# Patient Record
Sex: MALE | Race: WHITE | NOT HISPANIC OR LATINO | Employment: OTHER | URBAN - NONMETROPOLITAN AREA
[De-identification: names, ages, dates, MRNs, and addresses within clinical notes are randomized per-mention and may not be internally consistent; named-entity substitution may affect disease eponyms.]

---

## 2022-02-10 RX ORDER — OMEPRAZOLE 20 MG/1
20 CAPSULE, DELAYED RELEASE ORAL DAILY
COMMUNITY

## 2022-02-10 RX ORDER — MELOXICAM 15 MG/1
15 TABLET ORAL DAILY
COMMUNITY

## 2022-02-10 RX ORDER — MULTIPLE VITAMINS W/ MINERALS TAB 9MG-400MCG
1 TAB ORAL DAILY
COMMUNITY

## 2022-02-21 NOTE — PROGRESS NOTES
"Chief Complaint  Elevated PSA    Subjective          Mio Askew presents to [facility] for elevated PSA.  He has a PSA of 24.  Saw Dr. Castillo in [place] a year ago who scheduled him for biopsy and patient canceled and then failed follow-up.  He presents to me today.  Patient denies any possible systemic symptoms of prostate cancer such as weight loss, lower extremity edema, or skeletal pain that could be worrisome for systemic disease.          Current Outpatient Medications:   •  Adalimumab (Humira) 20 MG/0.4ML Prefilled Syringe Kit, Inject  under the skin into the appropriate area as directed., Disp: , Rfl:   •  meloxicam (MOBIC) 15 MG tablet, Take 15 mg by mouth Daily., Disp: , Rfl:   •  multivitamin with minerals tablet tablet, Take 1 tablet by mouth Daily., Disp: , Rfl:   •  omeprazole (priLOSEC) 20 MG capsule, Take 20 mg by mouth Daily., Disp: , Rfl:   •  ciprofloxacin (Cipro) 500 MG tablet, Take 1 tablet by mouth 2 (Two) Times a Day. Begin the day before the biopsy. Take one on the morning of surgery too., Disp: 6 tablet, Rfl: 0  Past Medical History:   Diagnosis Date   • Elevated PSA      History reviewed. No pertinent surgical history.        Review  of systems  Constitutional: Negative for chills or fever.   Gastrointestinal: Negative for abdominal pain, anal bleeding or blood in stool.           Objective   PHYSICAL EXAM  Vital Signs:   Temp 98.3 °F (36.8 °C)   Ht 175.3 cm (69\")   Wt 79.3 kg (174 lb 12.8 oz)   BMI 25.81 kg/m²     Constitutional: Patient is without distress or deformity.  Vital signs are reviewed as above.    Neuro: No confusion; No disorientation; Alert and oriented  Pulmonary: No respiratory distress.   Skin: No pallor or diaphoresis  GI: Abdomen is soft and nontender.  No significant distention.  No hernias noted.  : Penis and testicles are normal.  Abnormal feeling prostate exam with a right apical nodule greater than 2 cm extending through mid " portion of prostate as well as across midline.  The prostate DOES NOT FEEL fixed.      DATA  Result Review :              No results found for this or any previous visit.                     ASSESSMENT AND PLAN          Problem List Items Addressed This Visit     None      Visit Diagnoses     Elevated PSA    -  Primary    Relevant Medications    ciprofloxacin (Cipro) 500 MG tablet    Other Relevant Orders    Case Request (Completed)    COVID PRE-OP / PRE-PROCEDURE SCREENING ORDER (NO ISOLATION) - Swab, Nasopharynx      I discussed elevated PSA with him today. We discussed that PSA is a protein measured in the bloodstream that comes exclusively from the prostate gland.  I mentioned to him that all men with a prostate gland will have a certain PSA level. We discussed that this number can be compared to all men and age-specific PSA as well as PSA velocity. We discussed that Prostate Cancer is a possible cause of PSA elevaton, but benign etiologies such as infection, enlargement, aging, and inflammation should also be considered. We discussed that some patients with a normal PSA may also have prostate cancer. The necessity of digital rectal examination is also discussed. The role of free to total PSA Ratio is explained.  We also discussed the relatively recent recommendations of the national prevented task force.  It is explained that the PSA has been downgraded as a standard screening tool.  Essentially this downgrading recommends the study not be used or prostate cancer screening done due to the high risk of a diagnosis of prostate cancer that is life-threatening which, if treated, can result in sexual or severe urinary side effects.  This is compared with comments by both the American Urologic Association and the American Association Clinical Urologists. Discussions among the urology community has led most of us to feel strongly that a patient has a right to know if he has prostate cancer and that all options  "including a conservative approach to the management of prostate cancer should be discussed between A patient and a physician familiar with the treatment options for prostate cancer.  Additional emphasis is made regarding the possibility of the diagnosis of the nonlife threatening prostate cancer which could affect the patient psychologically or even result for treatment of disease were the risks of that treatment exceeds the risk of death.  The risks (infection, bleeding, pain, retention, sepsis) and possible benefits of transrectal ultrasound with biopsy of the prostate gland is also discussed. It is explained that some patients require a repeat biopsy based on diagnosis of prostate intraepithelial neoplasia or even a continued rising PSA after an initial biopsy. He voiced no additional questions. He has elected to proceed with TRUS and biopsy of the prostate     I have told the patient about the FDA warning regariding flouroquinolone use.  It is explained to the patient that this medication should not be used in cases of \"uncomplicated \"urinary tract infection. It is explained that these antibiotics continue to be listed as an option for prophylaxis for prostate biopsy. I also explained my experience with other antibiotics as prophylaxis which I think has resulted in increased episodes of prostatitis with bacteremia which can potentially be associated with sepsis. Patient is told to contact me should they have symptoms that I described that may involve tendons, muscles, nerves, or central nervous system.  It is explained that the medication would then be stopped and other less effective measures would be taken.  Of available products, I believe that this is the best antibiotic in this situation with the benefits outweighing the risks.      FOLLOW UP     No follow-ups on file.        (Please note that portions of this note were completed with a voice recognition program.)  Elia Blanco MD  02/22/22  11:54 CST  "

## 2022-02-22 ENCOUNTER — OFFICE VISIT (OUTPATIENT)
Dept: UROLOGY | Facility: CLINIC | Age: 57
End: 2022-02-22

## 2022-02-22 VITALS — HEIGHT: 69 IN | BODY MASS INDEX: 25.89 KG/M2 | TEMPERATURE: 98.3 F | WEIGHT: 174.8 LBS

## 2022-02-22 DIAGNOSIS — R97.20 ELEVATED PSA: Primary | ICD-10-CM

## 2022-02-22 PROCEDURE — 99204 OFFICE O/P NEW MOD 45 MIN: CPT | Performed by: UROLOGY

## 2022-02-22 RX ORDER — CIPROFLOXACIN 500 MG/1
500 TABLET, FILM COATED ORAL 2 TIMES DAILY
Qty: 6 TABLET | Refills: 0 | Status: SHIPPED | OUTPATIENT
Start: 2022-02-22 | End: 2022-02-25 | Stop reason: SDUPTHER

## 2022-02-22 RX ORDER — ADALIMUMAB 20MG/0.4ML
KIT SUBCUTANEOUS
COMMUNITY

## 2022-02-25 ENCOUNTER — PRE-ADMISSION TESTING (OUTPATIENT)
Dept: PREADMISSION TESTING | Facility: HOSPITAL | Age: 57
End: 2022-02-25

## 2022-02-25 ENCOUNTER — LAB (OUTPATIENT)
Dept: LAB | Facility: HOSPITAL | Age: 57
End: 2022-02-25

## 2022-02-25 VITALS
OXYGEN SATURATION: 100 % | HEIGHT: 69 IN | DIASTOLIC BLOOD PRESSURE: 85 MMHG | SYSTOLIC BLOOD PRESSURE: 128 MMHG | BODY MASS INDEX: 27.66 KG/M2 | WEIGHT: 186.73 LBS | RESPIRATION RATE: 18 BRPM | HEART RATE: 85 BPM

## 2022-02-25 DIAGNOSIS — R97.20 ELEVATED PSA: ICD-10-CM

## 2022-02-25 LAB
DEPRECATED RDW RBC AUTO: 43.4 FL (ref 37–54)
ERYTHROCYTE [DISTWIDTH] IN BLOOD BY AUTOMATED COUNT: 12.7 % (ref 12.3–15.4)
HCT VFR BLD AUTO: 42.5 % (ref 37.5–51)
HGB BLD-MCNC: 14.2 G/DL (ref 13–17.7)
MCH RBC QN AUTO: 30.9 PG (ref 26.6–33)
MCHC RBC AUTO-ENTMCNC: 33.4 G/DL (ref 31.5–35.7)
MCV RBC AUTO: 92.6 FL (ref 79–97)
PLATELET # BLD AUTO: 259 10*3/MM3 (ref 140–450)
PMV BLD AUTO: 9.9 FL (ref 6–12)
RBC # BLD AUTO: 4.59 10*6/MM3 (ref 4.14–5.8)
SARS-COV-2 ORF1AB RESP QL NAA+PROBE: DETECTED
WBC NRBC COR # BLD: 5.41 10*3/MM3 (ref 3.4–10.8)

## 2022-02-25 PROCEDURE — U0004 COV-19 TEST NON-CDC HGH THRU: HCPCS

## 2022-02-25 PROCEDURE — 36415 COLL VENOUS BLD VENIPUNCTURE: CPT

## 2022-02-25 PROCEDURE — C9803 HOPD COVID-19 SPEC COLLECT: HCPCS

## 2022-02-25 PROCEDURE — 85027 COMPLETE CBC AUTOMATED: CPT

## 2022-02-25 RX ORDER — CIPROFLOXACIN 500 MG/1
500 TABLET, FILM COATED ORAL 2 TIMES DAILY
Qty: 6 TABLET | Refills: 0 | Status: SHIPPED | OUTPATIENT
Start: 2022-02-25

## 2022-02-28 ENCOUNTER — ANESTHESIA EVENT (OUTPATIENT)
Dept: PERIOP | Facility: HOSPITAL | Age: 57
End: 2022-02-28

## 2022-02-28 ENCOUNTER — HOSPITAL ENCOUNTER (OUTPATIENT)
Facility: HOSPITAL | Age: 57
Setting detail: HOSPITAL OUTPATIENT SURGERY
Discharge: HOME | End: 2022-02-28
Attending: UROLOGY | Admitting: UROLOGY

## 2022-02-28 ENCOUNTER — ANESTHESIA (OUTPATIENT)
Dept: PERIOP | Facility: HOSPITAL | Age: 57
End: 2022-02-28

## 2022-02-28 VITALS
OXYGEN SATURATION: 98 % | SYSTOLIC BLOOD PRESSURE: 122 MMHG | HEART RATE: 82 BPM | TEMPERATURE: 98 F | DIASTOLIC BLOOD PRESSURE: 83 MMHG | RESPIRATION RATE: 15 BRPM

## 2022-02-28 DIAGNOSIS — R97.20 ELEVATED PSA: ICD-10-CM

## 2022-02-28 PROCEDURE — 76942 ECHO GUIDE FOR BIOPSY: CPT | Performed by: UROLOGY

## 2022-02-28 PROCEDURE — 55700 PR PROSTATE NEEDLE BIOPSY ANY APPROACH: CPT | Performed by: UROLOGY

## 2022-02-28 PROCEDURE — 25010000002 PROPOFOL 10 MG/ML EMULSION: Performed by: NURSE ANESTHETIST, CERTIFIED REGISTERED

## 2022-02-28 PROCEDURE — G0416 PROSTATE BIOPSY, ANY MTHD: HCPCS | Performed by: UROLOGY

## 2022-02-28 PROCEDURE — 25010000002 GENTAMICIN PER 80 MG: Performed by: UROLOGY

## 2022-02-28 PROCEDURE — 0 LIDOCAINE 1 % SOLUTION: Performed by: UROLOGY

## 2022-02-28 PROCEDURE — 25010000002 MIDAZOLAM PER 1 MG: Performed by: ANESTHESIOLOGY

## 2022-02-28 PROCEDURE — 25010000002 PROPOFOL 1000 MG/100ML EMULSION: Performed by: NURSE ANESTHETIST, CERTIFIED REGISTERED

## 2022-02-28 PROCEDURE — 25010000002 ONDANSETRON PER 1 MG: Performed by: NURSE ANESTHETIST, CERTIFIED REGISTERED

## 2022-02-28 PROCEDURE — 25010000002 MIDAZOLAM PER 1 MG: Performed by: NURSE ANESTHETIST, CERTIFIED REGISTERED

## 2022-02-28 PROCEDURE — 25010000002 FENTANYL CITRATE (PF) 100 MCG/2ML SOLUTION: Performed by: NURSE ANESTHETIST, CERTIFIED REGISTERED

## 2022-02-28 RX ORDER — LIDOCAINE HYDROCHLORIDE 10 MG/ML
0.5 INJECTION, SOLUTION EPIDURAL; INFILTRATION; INTRACAUDAL; PERINEURAL ONCE AS NEEDED
Status: DISCONTINUED | OUTPATIENT
Start: 2022-02-28 | End: 2022-02-28 | Stop reason: HOSPADM

## 2022-02-28 RX ORDER — SODIUM CHLORIDE, SODIUM LACTATE, POTASSIUM CHLORIDE, CALCIUM CHLORIDE 600; 310; 30; 20 MG/100ML; MG/100ML; MG/100ML; MG/100ML
1000 INJECTION, SOLUTION INTRAVENOUS CONTINUOUS
Status: DISCONTINUED | OUTPATIENT
Start: 2022-02-28 | End: 2022-02-28 | Stop reason: HOSPADM

## 2022-02-28 RX ORDER — ONDANSETRON 2 MG/ML
INJECTION INTRAMUSCULAR; INTRAVENOUS AS NEEDED
Status: DISCONTINUED | OUTPATIENT
Start: 2022-02-28 | End: 2022-02-28 | Stop reason: SURG

## 2022-02-28 RX ORDER — DEXTROSE MONOHYDRATE 25 G/50ML
12.5 INJECTION, SOLUTION INTRAVENOUS AS NEEDED
Status: DISCONTINUED | OUTPATIENT
Start: 2022-02-28 | End: 2022-02-28 | Stop reason: HOSPADM

## 2022-02-28 RX ORDER — ONDANSETRON 2 MG/ML
4 INJECTION INTRAMUSCULAR; INTRAVENOUS ONCE AS NEEDED
Status: DISCONTINUED | OUTPATIENT
Start: 2022-02-28 | End: 2022-02-28 | Stop reason: HOSPADM

## 2022-02-28 RX ORDER — LABETALOL HYDROCHLORIDE 5 MG/ML
5 INJECTION, SOLUTION INTRAVENOUS
Status: DISCONTINUED | OUTPATIENT
Start: 2022-02-28 | End: 2022-02-28 | Stop reason: HOSPADM

## 2022-02-28 RX ORDER — SODIUM CHLORIDE 0.9 % (FLUSH) 0.9 %
3 SYRINGE (ML) INJECTION AS NEEDED
Status: DISCONTINUED | OUTPATIENT
Start: 2022-02-28 | End: 2022-02-28 | Stop reason: HOSPADM

## 2022-02-28 RX ORDER — OXYCODONE AND ACETAMINOPHEN 7.5; 325 MG/1; MG/1
2 TABLET ORAL EVERY 4 HOURS PRN
Status: DISCONTINUED | OUTPATIENT
Start: 2022-02-28 | End: 2022-02-28 | Stop reason: HOSPADM

## 2022-02-28 RX ORDER — OXYCODONE AND ACETAMINOPHEN 10; 325 MG/1; MG/1
1 TABLET ORAL ONCE AS NEEDED
Status: DISCONTINUED | OUTPATIENT
Start: 2022-02-28 | End: 2022-02-28 | Stop reason: HOSPADM

## 2022-02-28 RX ORDER — OXYCODONE HYDROCHLORIDE AND ACETAMINOPHEN 5; 325 MG/1; MG/1
1 TABLET ORAL ONCE AS NEEDED
Status: DISCONTINUED | OUTPATIENT
Start: 2022-02-28 | End: 2022-02-28 | Stop reason: HOSPADM

## 2022-02-28 RX ORDER — LIDOCAINE HYDROCHLORIDE 10 MG/ML
INJECTION, SOLUTION INFILTRATION; PERINEURAL AS NEEDED
Status: DISCONTINUED | OUTPATIENT
Start: 2022-02-28 | End: 2022-02-28 | Stop reason: HOSPADM

## 2022-02-28 RX ORDER — ULTRASOUND COUPLING MEDIUM
GEL (GRAM) TOPICAL AS NEEDED
Status: DISCONTINUED | OUTPATIENT
Start: 2022-02-28 | End: 2022-02-28 | Stop reason: HOSPADM

## 2022-02-28 RX ORDER — TRAMADOL HYDROCHLORIDE 50 MG/1
50 TABLET ORAL EVERY 6 HOURS PRN
Qty: 12 TABLET | Refills: 0 | Status: SHIPPED | OUTPATIENT
Start: 2022-02-28

## 2022-02-28 RX ORDER — FENTANYL CITRATE 50 UG/ML
25 INJECTION, SOLUTION INTRAMUSCULAR; INTRAVENOUS
Status: DISCONTINUED | OUTPATIENT
Start: 2022-02-28 | End: 2022-02-28 | Stop reason: HOSPADM

## 2022-02-28 RX ORDER — FENTANYL CITRATE 50 UG/ML
INJECTION, SOLUTION INTRAMUSCULAR; INTRAVENOUS AS NEEDED
Status: DISCONTINUED | OUTPATIENT
Start: 2022-02-28 | End: 2022-02-28 | Stop reason: SURG

## 2022-02-28 RX ORDER — SODIUM CHLORIDE 0.9 % (FLUSH) 0.9 %
10 SYRINGE (ML) INJECTION AS NEEDED
Status: DISCONTINUED | OUTPATIENT
Start: 2022-02-28 | End: 2022-02-28 | Stop reason: HOSPADM

## 2022-02-28 RX ORDER — NALOXONE HCL 0.4 MG/ML
0.4 VIAL (ML) INJECTION AS NEEDED
Status: DISCONTINUED | OUTPATIENT
Start: 2022-02-28 | End: 2022-02-28 | Stop reason: HOSPADM

## 2022-02-28 RX ORDER — DROPERIDOL 2.5 MG/ML
0.62 INJECTION, SOLUTION INTRAMUSCULAR; INTRAVENOUS ONCE AS NEEDED
Status: DISCONTINUED | OUTPATIENT
Start: 2022-02-28 | End: 2022-02-28 | Stop reason: HOSPADM

## 2022-02-28 RX ORDER — FLUMAZENIL 0.1 MG/ML
0.2 INJECTION INTRAVENOUS AS NEEDED
Status: DISCONTINUED | OUTPATIENT
Start: 2022-02-28 | End: 2022-02-28 | Stop reason: HOSPADM

## 2022-02-28 RX ORDER — SODIUM CHLORIDE 0.9 % (FLUSH) 0.9 %
10 SYRINGE (ML) INJECTION EVERY 12 HOURS SCHEDULED
Status: DISCONTINUED | OUTPATIENT
Start: 2022-02-28 | End: 2022-02-28 | Stop reason: HOSPADM

## 2022-02-28 RX ORDER — GENTAMICIN SULFATE 80 MG/100ML
80 INJECTION, SOLUTION INTRAVENOUS ONCE
Status: COMPLETED | OUTPATIENT
Start: 2022-02-28 | End: 2022-02-28

## 2022-02-28 RX ORDER — IBUPROFEN 600 MG/1
600 TABLET ORAL ONCE AS NEEDED
Status: DISCONTINUED | OUTPATIENT
Start: 2022-02-28 | End: 2022-02-28 | Stop reason: HOSPADM

## 2022-02-28 RX ORDER — MIDAZOLAM HYDROCHLORIDE 1 MG/ML
INJECTION INTRAMUSCULAR; INTRAVENOUS AS NEEDED
Status: DISCONTINUED | OUTPATIENT
Start: 2022-02-28 | End: 2022-02-28 | Stop reason: SURG

## 2022-02-28 RX ORDER — KETAMINE HCL IN NACL, ISO-OSM 100MG/10ML
SYRINGE (ML) INJECTION AS NEEDED
Status: DISCONTINUED | OUTPATIENT
Start: 2022-02-28 | End: 2022-02-28 | Stop reason: SURG

## 2022-02-28 RX ORDER — PROPOFOL 10 MG/ML
VIAL (ML) INTRAVENOUS AS NEEDED
Status: DISCONTINUED | OUTPATIENT
Start: 2022-02-28 | End: 2022-02-28 | Stop reason: SURG

## 2022-02-28 RX ORDER — SODIUM CHLORIDE, SODIUM LACTATE, POTASSIUM CHLORIDE, CALCIUM CHLORIDE 600; 310; 30; 20 MG/100ML; MG/100ML; MG/100ML; MG/100ML
9 INJECTION, SOLUTION INTRAVENOUS CONTINUOUS
Status: DISCONTINUED | OUTPATIENT
Start: 2022-02-28 | End: 2022-02-28 | Stop reason: HOSPADM

## 2022-02-28 RX ORDER — MIDAZOLAM HYDROCHLORIDE 1 MG/ML
1 INJECTION INTRAMUSCULAR; INTRAVENOUS
Status: COMPLETED | OUTPATIENT
Start: 2022-02-28 | End: 2022-02-28

## 2022-02-28 RX ORDER — PROPOFOL 10 MG/ML
INJECTION, EMULSION INTRAVENOUS AS NEEDED
Status: DISCONTINUED | OUTPATIENT
Start: 2022-02-28 | End: 2022-02-28 | Stop reason: SURG

## 2022-02-28 RX ADMIN — Medication 30 MG: at 09:07

## 2022-02-28 RX ADMIN — MIDAZOLAM 1 MG: 1 INJECTION INTRAMUSCULAR; INTRAVENOUS at 08:49

## 2022-02-28 RX ADMIN — PROPOFOL 125 MCG/KG/MIN: 10 INJECTION, EMULSION INTRAVENOUS at 09:03

## 2022-02-28 RX ADMIN — SODIUM CHLORIDE, POTASSIUM CHLORIDE, SODIUM LACTATE AND CALCIUM CHLORIDE 1000 ML: 600; 310; 30; 20 INJECTION, SOLUTION INTRAVENOUS at 08:41

## 2022-02-28 RX ADMIN — FENTANYL CITRATE 50 MCG: 50 INJECTION, SOLUTION INTRAMUSCULAR; INTRAVENOUS at 09:03

## 2022-02-28 RX ADMIN — MIDAZOLAM 1 MG: 1 INJECTION INTRAMUSCULAR; INTRAVENOUS at 09:00

## 2022-02-28 RX ADMIN — MIDAZOLAM 1 MG: 1 INJECTION INTRAMUSCULAR; INTRAVENOUS at 09:03

## 2022-02-28 RX ADMIN — MIDAZOLAM 1 MG: 1 INJECTION INTRAMUSCULAR; INTRAVENOUS at 08:59

## 2022-02-28 RX ADMIN — GENTAMICIN SULFATE 80 MG: 80 INJECTION, SOLUTION INTRAVENOUS at 08:40

## 2022-02-28 RX ADMIN — PROPOFOL 100 MG: 10 INJECTION, EMULSION INTRAVENOUS at 09:08

## 2022-02-28 RX ADMIN — Medication 20 MG: at 09:03

## 2022-02-28 RX ADMIN — ONDANSETRON 4 MG: 2 INJECTION INTRAMUSCULAR; INTRAVENOUS at 09:16

## 2022-02-28 RX ADMIN — FENTANYL CITRATE 50 MCG: 50 INJECTION, SOLUTION INTRAMUSCULAR; INTRAVENOUS at 09:00

## 2022-02-28 NOTE — ANESTHESIA PREPROCEDURE EVALUATION
Anesthesia Evaluation     Patient summary reviewed   no history of anesthetic complications:  NPO Solid Status: > 8 hours             Airway   Mallampati: II  TM distance: >3 FB  Neck ROM: full  Dental      Pulmonary    (+) pneumonia , a smoker,   (-) COPD, asthma, sleep apnea  Cardiovascular   Exercise tolerance: excellent (>7 METS)    (-) pacemaker, past MI, angina, cardiac stents      Neuro/Psych  (-) seizures, TIA, CVA  GI/Hepatic/Renal/Endo    (+)  GERD,    (-) liver disease, no renal disease, diabetes    Musculoskeletal     Abdominal    Substance History      OB/GYN          Other                        Anesthesia Plan    ASA 2     MAC     intravenous induction     Anesthetic plan, all risks, benefits, and alternatives have been provided, discussed and informed consent has been obtained with: patient.        CODE STATUS:

## 2022-02-28 NOTE — ANESTHESIA POSTPROCEDURE EVALUATION
Patient: Mio Askew    Procedure Summary     Date: 02/28/22 Room / Location:  [facility] OR 01 / [facility]    Anesthesia Start: 0900 Anesthesia Stop: 0924    Procedure: PROSTATE ULTRASOUND BIOPSY. NOT A URONAV (N/A ) Diagnosis:       Elevated PSA      (Elevated PSA [R97.20])    Surgeons: Elia Blacno MD Provider: Rossy Cisse CRNA    Anesthesia Type: general ASA Status: 2          Anesthesia Type: general    Vitals  Vitals Value Taken Time   /89 02/28/22 0942   Temp 98 °F (36.7 °C) 02/28/22 0950   Pulse 83 02/28/22 0952   Resp 15 02/28/22 0950   SpO2 99 % 02/28/22 0951   Vitals shown include unvalidated device data.        Post Anesthesia Care and Evaluation    Patient location during evaluation: PACU  Patient participation: complete - patient participated  Level of consciousness: awake  Pain management: adequate  Airway patency: patent  Anesthetic complications: No anesthetic complications  PONV Status: none  Cardiovascular status: acceptable  Respiratory status: acceptable  Hydration status: acceptable    Comments: /83   Pulse 82   Temp 98 °F (36.7 °C) (Temporal)   Resp 15   SpO2 98%

## 2022-03-01 LAB
CYTO UR: NORMAL
LAB AP CASE REPORT: NORMAL
PATH REPORT.FINAL DX SPEC: NORMAL
PATH REPORT.GROSS SPEC: NORMAL

## 2022-03-07 NOTE — PROGRESS NOTES
I have attempted to reach him three times since his biopsy results have come back. His voicemail box is full. Will try to reach again later this week.

## 2022-03-08 ENCOUNTER — TELEPHONE (OUTPATIENT)
Dept: UROLOGY | Facility: CLINIC | Age: 57
End: 2022-03-08

## 2022-03-11 ENCOUNTER — TELEPHONE (OUTPATIENT)
Dept: UROLOGY | Facility: CLINIC | Age: 57
End: 2022-03-11

## 2022-03-11 NOTE — PROGRESS NOTES
This patient is again attempted to be reached by phone unsuccessfully.  It also gives a message that his mailbox is full.  I have tried to call this patient multiple times with his pathology report which shows cancer.  Certainly if this patient were to call us back we will at least let him know the pathology report does show cancer and then he needs a CT scan of the abdomen, and pelvis, as well as a bone scan.  Would also attempt to set up a cancer consult with me to discuss his options.    I am going to send a letter that needs to be mailed to him registered.

## 2022-03-11 NOTE — TELEPHONE ENCOUNTER
"I did reach his father, Eliud Askew, that has him listed as his \"patient contact \".  I told him that I have been trying to reach his son about his biopsy.  I did not leave results because I do not have permission in the chart to do that.  Frankly he did not even ask about them.  He told me he lives in [place] and does not see his son.  He also said he would know what to do about his cell phone situation.  As mentioned on previous notes the patient's voice mailbox is full and he is not answering the phone.  I am going to try a registered letter.  Electronically signed by Elia Blanco MD, 03/11/22, 10:09 AM CST.    "

## 2022-03-15 DIAGNOSIS — R97.20 ELEVATED PSA: Primary | ICD-10-CM

## 2022-03-15 NOTE — TELEPHONE ENCOUNTER
Patient called back I gave him the information let him know he will need to be scheduled for a ct abd/pelvis and a bone scan before his cancer consult apt. He is scheduled end of clinic on 03/31 @ 6240. Pt verbalized understanding. I answered his questions to the best of my abilities.

## 2022-08-09 ENCOUNTER — TELEPHONE (OUTPATIENT)
Dept: UROLOGY | Age: 57
End: 2022-08-09

## 2022-08-09 DIAGNOSIS — D07.5 CARCINOMA IN SITU OF PROSTATE: Primary | ICD-10-CM

## 2022-08-12 NOTE — TELEPHONE ENCOUNTER
After reviewing records it was determined Dr. Yuliya Galeas needs to review prior to scheduling. Records on his desk for review prior to any apt made.
Alexa Taylor called to f/u on pt referral, scanned in media, please review and advise for scheduling.
Called and notified Bryant Claudio pt would not be seen here, per provider due to his non compliance and being established with other urologist in the past.
Unable to access careeverywhere on this pt but based [facility] with [facility]b [facility] he is a [facility] pt of Dr. Dionicio Hunt showing prostate cancer. Will need office notes from them as well as any other office notes if Dr. Dionicio Hunt referred out to treat. Once received please print and give to me to review.     Please input referral under referral tab for this
Waiting on Alexander's office to send records.  Requested twice now
no

## 2022-09-29 ENCOUNTER — TELEPHONE (OUTPATIENT)
Dept: UROLOGY | Facility: CLINIC | Age: 57
End: 2022-09-29

## 2022-09-29 NOTE — TELEPHONE ENCOUNTER
[facility] spoke to the [facility] ([facility]P) so they were aware. Last contact with patient they had was around June and the patient requested a second opinion. They have tried to contact patient since with no response.

## 2022-09-29 NOTE — TELEPHONE ENCOUNTER
We received certified letter back with a note of unable to forward. I left voicemail for patient to call our office and it was very important.

## 2023-02-17 ENCOUNTER — TELEPHONE (OUTPATIENT)
Dept: UROLOGY | Facility: CLINIC | Age: 58
End: 2023-02-17
Payer: OTHER GOVERNMENT

## 2023-02-17 NOTE — TELEPHONE ENCOUNTER
Pt called requesting his Tissue Pathology report faxed to Johnathan Irvin at the [facility]. I faxed over the report to [phone]

## 2023-03-02 ENCOUNTER — TELEPHONE (OUTPATIENT)
Dept: UROLOGY | Facility: CLINIC | Age: 58
End: 2023-03-02
Payer: OTHER GOVERNMENT

## 2023-03-02 NOTE — TELEPHONE ENCOUNTER
Patient called requesting his biopsy results from last March due to never being contacted.  I informed patient that Dr. Blanco tried on multiple occasions without a response, and that he even called his father to try to get ahold of him.  Patient became very aggressive and vulgar stating we don't care if he has cancer because if we did, we would have contacted him.    I looked into the chart, we sent a certified letter with said results to patient's residence, we got a notification back stating that the address doesn't exist. I confirmed same address with patient today, he said that is the correct one.   He also had a follow up appointment and imaging that he no showed to.    I told him that I would look into the situation and try to do my best to help him out.

## 2024-05-22 ENCOUNTER — TRANSCRIBE ORDERS (OUTPATIENT)
Dept: ADMINISTRATIVE | Facility: HOSPITAL | Age: 59
End: 2024-05-22
Payer: OTHER GOVERNMENT

## 2024-05-22 DIAGNOSIS — C61 MALIGNANT NEOPLASM OF PROSTATE: Primary | ICD-10-CM

## 2024-06-04 ENCOUNTER — TRANSCRIBE ORDERS (OUTPATIENT)
Dept: ADMINISTRATIVE | Facility: HOSPITAL | Age: 59
End: 2024-06-04
Payer: OTHER GOVERNMENT

## 2024-06-15 ENCOUNTER — TRANSCRIBE ORDERS (OUTPATIENT)
Dept: ADMINISTRATIVE | Facility: HOSPITAL | Age: 59
End: 2024-06-15
Payer: OTHER GOVERNMENT

## 2024-06-15 DIAGNOSIS — D07.5 CARCINOMA IN SITU OF PROSTATE: Primary | ICD-10-CM

## 2024-06-21 ENCOUNTER — TRANSCRIBE ORDERS (OUTPATIENT)
Dept: ADMINISTRATIVE | Facility: HOSPITAL | Age: 59
End: 2024-06-21
Payer: OTHER GOVERNMENT

## 2024-07-02 ENCOUNTER — HOSPITAL ENCOUNTER (OUTPATIENT)
Dept: CT IMAGING | Facility: HOSPITAL | Age: 59
Discharge: HOME | End: 2024-07-02
Payer: OTHER GOVERNMENT

## 2024-12-30 ENCOUNTER — TRANSCRIBE ORDERS (OUTPATIENT)
Dept: ADMINISTRATIVE | Facility: HOSPITAL | Age: 59
End: 2024-12-30
Payer: OTHER GOVERNMENT

## 2024-12-30 ENCOUNTER — HOSPITAL ENCOUNTER (OUTPATIENT)
Dept: MRI IMAGING | Facility: HOSPITAL | Age: 59
Discharge: HOME | End: 2024-12-30
Admitting: NURSE PRACTITIONER
Payer: OTHER GOVERNMENT

## 2024-12-30 DIAGNOSIS — D07.5 CARCINOMA IN SITU OF PROSTATE: Primary | ICD-10-CM

## 2025-01-24 ENCOUNTER — HOSPITAL ENCOUNTER (OUTPATIENT)
Dept: MRI IMAGING | Facility: HOSPITAL | Age: 60
Discharge: HOME | End: 2025-01-24
Admitting: NURSE PRACTITIONER
Payer: OTHER GOVERNMENT

## 2025-01-24 ENCOUNTER — HOSPITAL ENCOUNTER (OUTPATIENT)
Dept: MRI IMAGING | Facility: HOSPITAL | Age: 60
Discharge: HOME | End: 2025-01-24
Payer: OTHER GOVERNMENT

## 2025-02-26 PROBLEM — C61 MALIGNANT NEOPLASM OF PROSTATE: Status: ACTIVE | Noted: 2024-04-04

## 2025-02-27 PROBLEM — F17.200 CURRENT EVERY DAY SMOKER: Status: ACTIVE | Noted: 2025-02-27

## 2025-03-17 ENCOUNTER — TELEPHONE (OUTPATIENT)
Age: 60
End: 2025-03-17
Payer: OTHER GOVERNMENT

## 2025-03-17 NOTE — TELEPHONE ENCOUNTER
Called patient about missed appointment today to see if they need to reschedule. I left message on voicemail.

## 2025-03-18 ENCOUNTER — TELEPHONE (OUTPATIENT)
Age: 60
End: 2025-03-18
Payer: OTHER GOVERNMENT

## 2025-03-18 NOTE — TELEPHONE ENCOUNTER
Tried to reach patient regarding his No show appointment. I have left a message on his number and his Dad's number to give us a call if he wants to reschedule.

## 2025-03-25 ENCOUNTER — TELEPHONE (OUTPATIENT)
Age: 60
End: 2025-03-25
Payer: OTHER GOVERNMENT
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
## 2025-04-08 NOTE — PROGRESS NOTES
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 encounter.    RECOMMENDATIONS:  Mio Askew was diagnosed ***(Summary)    Indications and rationale as well as risks, benefits and alternatives of therapy for carcinoma of the prostate were discussed today. Risks of radiation therapy includes but is not limited to radiation induced proctitis, cystitis, diarrhea, dysuria, frequency and bleeding from the bladder or rectum as well as a significant risk of permanent erectile dysfunction and progression of disease in spite of therapy with either local or systemic failure.  The option of definitive surgery has also been reviewed by the urologist as well as surveillance. We discussed the goals and plans of care with him and his family, answered all questions and he verbalizes understanding. I have seen, examined and reviewed this patient's medication list, appropriate labs and imaging studies as well as other physician notes.    A definitive course of fractionated radiation therapy is recommended to the {Rad Onc Prostate/Bed (Optional):84626}. I anticipate a course of {Rad Onc Prostate Dose (Optional):77996}.    The patient and family verbalize  understanding of this discussion, voice no further questions and wish to {Rad Pt decision:08803}    ***Will refer back to urologist for initiation of ADT, seed placement and Space OAR gel placement. Continue ongoing management per primary care physician and other specialists.    Mio Askew  reports that he has been smoking cigarettes. He has never used smokeless tobacco. I have educated him on the risk of diseases from using tobacco products such as cancer, COPD, and heart disease. I spent >10 minutes counseling the patient.    Thank you for allowing me to assist in his care.    There are no Patient Instructions on file for this visit.    No follow-ups on file.    {Time Spent (Optional):82129}  Dina Yo LPN  04/10/2025

## 2025-04-09 ENCOUNTER — HOSPITAL ENCOUNTER (OUTPATIENT)
Dept: RADIATION ONCOLOGY | Facility: HOSPITAL | Age: 60
Setting detail: RADIATION/ONCOLOGY SERIES
End: 2025-04-09
Payer: OTHER GOVERNMENT

## 2025-04-10 ENCOUNTER — APPOINTMENT (OUTPATIENT)
Age: 60
End: 2025-04-10
Payer: OTHER GOVERNMENT

## 2025-04-10 DIAGNOSIS — C61 MALIGNANT NEOPLASM OF PROSTATE: Primary | ICD-10-CM

## 2025-04-10 DIAGNOSIS — F17.200 CURRENT EVERY DAY SMOKER: ICD-10-CM

## 2025-04-17 NOTE — PROGRESS NOTES
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      Mental Status: He is alert and oriented to person, place, and time.      Motor: No weakness.   Psychiatric:         Mood and Affect: Mood normal.         Behavior: Behavior normal.         Performance Status: ECOG (0) Fully active, able to carry on all predisease performance without restriction    Clinical Quality Measures  - Pain Documented by Standardized Tool, FPS Mio Askew reports a pain score of 0. Given his pain assessment as noted, treatment options were discussed and the following options were decided upon as a follow-up plan to address the patient's pain:  No pain, no plan given .  Pain Medications              Adalimumab (Humira) 20 MG/0.4ML Prefilled Syringe Kit Inject  under the skin into the appropriate area as directed Every 14 (Fourteen) Days.    meloxicam (MOBIC) 15 MG tablet Take 1 tablet by mouth Daily.          - Body Mass Index Screening and Follow-Up Plan  BMI is >= 25 and <30. (Overweight) The following options were offered after discussion;: none (medical contraindication)    - Tobacco Use: Screening and Cessation Intervention  Social History    Tobacco Use      Smoking status: Every Day        Packs/day: 1.00        Types: Cigarettes       "Smokeless tobacco: Never    Smoking cessation information given in after visit summary.    - Advanced Care Planning Advance Care Planning  ACP discussion was held with the patient during this visit. Patient does not have an advance directive, information provided.    - PHQ-2 Depression Screening  Little interest or pleasure in doing things? Not at all   Feeling down, depressed, or hopeless? Not at all (\"once a week\")   PHQ-2 Total Score 0     PROSTATE PQRS #102   Bone Scan Use: Bone Scane done Pre-treatment or Post Diagnosis  Risk of Recurrence: High risk PSA > 20/GL 8-10/T3a  Measure #104  Adjuvant Hormonal TX: Hormonal Therapy Not Prescribed/Administered. Reason Not Specified  Recurrence Risk: High risk PSA > 20/GL 8-10/T3a    ASSESSMENT AND PLAN  1. Malignant neoplasm of prostate    2. Current every day smoker      Orders Placed This Encounter   Procedures    PSA Diagnostic     Standing Status:   Future     Number of Occurrences:   1     Expected Date:   4/27/2025     Expiration Date:   7/22/2026     Release to patient:   Routine Release [[id number]]    Ambulatory Referral to ONC Social Work     Referral Priority:   Routine     Referral Type:   Clinical Pathway     Referral Reason:   Specialty Services Required     Requested Specialty:        Number of Visits Requested:   1    Ambulatory Referral to Urology     Referral Priority:   Routine     Referral Type:   Consultation     Referral Reason:   Specialty Services Required     Referred to Provider:   Elia Blanco MD     Requested Specialty:   Urology     Number of Visits Requested:   1    Ambulatory Referral to General Surgery     Referral Priority:   Routine     Referral Type:   Consultation     Referral Reason:   Specialty Services Required     Referred to Provider:   Rubens Cosby MD     Requested Specialty:   General Surgery     Number of Visits Requested:   1     RECOMMENDATIONS:  Mio Askew was diagnosed high risk prostate " carcinoma.  He has been seen at multiple facilities over the last several years including [facility].  However, he has delayed proceeding with initiation of any form of therapy.  This time he states that he is ready to proceed with definitive radiotherapy.  He is on endocrine at that therapy at this time but does not want to go on an injectable form such as Lupron.    Indications and rationale as well as risks, benefits and alternatives of therapy for carcinoma of the prostate were discussed today. Risks of radiation therapy includes but is not limited to radiation induced proctitis, cystitis, diarrhea, dysuria, frequency and bleeding from the bladder or rectum as well as a significant risk of permanent erectile dysfunction and progression of disease in spite of therapy with either local or systemic failure.  The option of definitive surgery has also been reviewed by the urologist as well as surveillance. We discussed the goals and plans of care with him and his family, answered all questions and he verbalizes understanding. I have seen, examined and reviewed this patient's medication list, appropriate labs and imaging studies as well as other physician notes.    A definitive course of fractionated radiation therapy is recommended to the prostate and regional pelvic lymph nodes. I anticipate a course of 7000 cGy over 28 treatment fractions with 5040 cGy to the pelvic lymph nodes.    The patient verbalize understanding of this discussion, voice no further questions and wish to process with recommended therapy.  Will refer to urologist for seed placement and Space OAR gel placement. Continue ongoing management per primary care physician and other specialists.    Mio Askew  reports that he has been smoking cigarettes. He has never used smokeless tobacco. I have educated him on the risk of diseases from using tobacco products such as cancer, COPD, and heart disease. I spent >10 minutes counseling the  patient.    Thank you for allowing me to assist in his care.    Patient Instructions   1)  PSA level today to check if pill is working on prostate cancer  2)  Referral made to urology for seeds and gel, they will call you  3)  We will see you back about a week after seeds and gel for CT treatment planning for your prostate cancer  4)  Plan on 28 radiation treatments M-F for about 30 minutes daily over about 6 weeks.  5)  Referral made to surgeon to fix hernia, they will call you      Time Spent: I spent 95 minutes caring for Mio on this date of service. This time includes time spent by me in the following activities: preparing for the visit, reviewing tests, obtaining and/or reviewing a separately obtained history, performing a medically appropriate examination and/or evaluation, counseling and educating the patient/family/caregiver, ordering medications, tests, or procedures, and documenting information in the medical record.   Yosef Mcfadden III, MD  04/22/2025

## 2025-04-21 ENCOUNTER — TELEPHONE (OUTPATIENT)
Age: 60
End: 2025-04-21
Payer: OTHER GOVERNMENT

## 2025-04-22 ENCOUNTER — DOCUMENTATION (OUTPATIENT)
Dept: RADIATION ONCOLOGY | Facility: HOSPITAL | Age: 60
End: 2025-04-22
Payer: OTHER GOVERNMENT

## 2025-04-22 ENCOUNTER — CONSULT (OUTPATIENT)
Age: 60
End: 2025-04-22
Payer: OTHER GOVERNMENT

## 2025-04-22 ENCOUNTER — LAB (OUTPATIENT)
Dept: LAB | Facility: HOSPITAL | Age: 60
End: 2025-04-22
Payer: OTHER GOVERNMENT

## 2025-04-22 VITALS
HEIGHT: 69 IN | SYSTOLIC BLOOD PRESSURE: 137 MMHG | DIASTOLIC BLOOD PRESSURE: 80 MMHG | WEIGHT: 180 LBS | BODY MASS INDEX: 26.66 KG/M2

## 2025-04-22 DIAGNOSIS — F17.200 CURRENT EVERY DAY SMOKER: ICD-10-CM

## 2025-04-22 DIAGNOSIS — C61 MALIGNANT NEOPLASM OF PROSTATE: Primary | ICD-10-CM

## 2025-04-22 DIAGNOSIS — C61 MALIGNANT NEOPLASM OF PROSTATE: ICD-10-CM

## 2025-04-22 LAB — PSA SERPL-MCNC: 2.68 NG/ML (ref 0–4)

## 2025-04-22 PROCEDURE — 84153 ASSAY OF PSA TOTAL: CPT

## 2025-04-22 PROCEDURE — 99205 OFFICE O/P NEW HI 60 MIN: CPT | Performed by: RADIOLOGY

## 2025-04-22 PROCEDURE — G2212 PROLONG OUTPT/OFFICE VIS: HCPCS | Performed by: RADIOLOGY

## 2025-04-22 PROCEDURE — 36415 COLL VENOUS BLD VENIPUNCTURE: CPT

## 2025-04-22 PROCEDURE — G0463 HOSPITAL OUTPT CLINIC VISIT: HCPCS | Performed by: RADIOLOGY

## 2025-04-22 RX ORDER — FLUTICASONE PROPIONATE 50 MCG
2 SPRAY, SUSPENSION (ML) NASAL DAILY
COMMUNITY

## 2025-04-22 NOTE — PATIENT INSTRUCTIONS
1)  PSA level today to check if pill is working on prostate cancer  2)  Referral made to urology for seeds and gel, they will call you  3)  We will see you back about a week after seeds and gel for CT treatment planning for your prostate cancer  4)  Plan on 28 radiation treatments M-F for about 30 minutes daily over about 6 weeks.  5)  Referral made to surgeon to fix hernia, they will call you
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
## 2025-04-23 ENCOUNTER — RESULTS FOLLOW-UP (OUTPATIENT)
Age: 60
End: 2025-04-23
Payer: OTHER GOVERNMENT

## 2025-04-23 NOTE — TELEPHONE ENCOUNTER
----- Message from Yosef Mcfadden sent at 4/23/2025  7:58 AM CDT -----  Please notify him of results  ----- Message -----  From: Lab, Background User  Sent: 4/22/2025   8:59 PM CDT  To: Yosef Mcfadden III, MD

## 2025-04-25 ENCOUNTER — TELEPHONE (OUTPATIENT)
Age: 60
End: 2025-04-25
Payer: OTHER GOVERNMENT

## 2025-05-08 ENCOUNTER — TELEPHONE (OUTPATIENT)
Dept: SURGERY | Facility: CLINIC | Age: 60
End: 2025-05-08
Payer: OTHER GOVERNMENT

## 2025-05-08 NOTE — TELEPHONE ENCOUNTER
Attempted to contact patient due to a missed appointment today. I left patient a detailed voicemail with our office phone number to call back to reschedule.     ECC  5/8/25

## 2025-05-16 ENCOUNTER — OFFICE VISIT (OUTPATIENT)
Dept: SURGERY | Facility: CLINIC | Age: 60
End: 2025-05-16
Payer: OTHER GOVERNMENT

## 2025-05-16 VITALS
HEIGHT: 69 IN | HEART RATE: 83 BPM | SYSTOLIC BLOOD PRESSURE: 135 MMHG | DIASTOLIC BLOOD PRESSURE: 87 MMHG | BODY MASS INDEX: 26.81 KG/M2 | OXYGEN SATURATION: 99 % | WEIGHT: 181 LBS

## 2025-05-16 DIAGNOSIS — K40.90 INGUINAL HERNIA WITHOUT OBSTRUCTION OR GANGRENE, RECURRENCE NOT SPECIFIED, UNSPECIFIED LATERALITY: Primary | ICD-10-CM

## 2025-05-16 NOTE — PROGRESS NOTES
[facility] General Surgery Clinic History and Physical  Mio Askew  MRN: [id number]  Age: 59 y.o. male     YOB: 1965    Primary   Problem      Plaints of inguinal hernia    SUBJECTIVE  History  of Presenting Illness   Patient is a 59 y.o. male with pertinent past medical history of prostate cancer that was diagnosed on biopsy in 2022, for which he has still not started treatment-he has no showed multiple appointments, both at [facility], and with [facility] here, presenting for evaluation of inguinal hernia.    Per patient report, when he underwent prostate biopsy in 2022, part of the biopsy needle mechanism was left inside of him, and he believes this was torn and open, and caused an inguinal hernia.  Later states that he was not even aware of the hernia, but was found on imaging.  He does have a report of a PET scan from [facility] done earlier this year, that does mention an inguinal hernia, but I do not have that imaging available at this visit.  He does report that [facility] has him on Orgovyx and radiation therapy, for the prostate cancer, but does not have other details regarding his treatment regimen, his next follow-up is.      Past Medical History     Past Medical History:  Past Medical History:   Diagnosis Date    Asthma     Elevated PSA     GERD (gastroesophageal reflux disease)     PONV (postoperative nausea and vomiting)        PCP: Julien Barajas PA    Past Surgical History:  Past Surgical History:   Procedure Laterality Date    EYE SURGERY      KNEE SURGERY      PROSTATE ULTRASOUND BIOPSY N/A 2/28/2022    Procedure: PROSTATE ULTRASOUND BIOPSY. NOT A URONAV;  Surgeon: Elia Blanco MD;  [facility];  Service: Urology;  Laterality: N/A;    TRIGGER FINGER RELEASE      WRIST GANGLION EXCISION         Family History:  History reviewed. No pertinent family history.    Social History:  Social History     Tobacco Use    Smoking status: Every Day     Current packs/day:  "1.00     Types: Cigarettes    Smokeless tobacco: Never   Substance Use Topics    Alcohol use: Yes     Comment: occ       Allergies:  Allergies   Allergen Reactions    Augmentin [Amoxicillin-Pot Clavulanate] Other (See Comments) and GI Intolerance     MIGRAINE AND VOMITTING    Vicodin Hp [Hydrocodone-Acetaminophen] Other (See Comments) and GI Intolerance     MIGRAINE AND VOMITTING    Morphine GI Intolerance       Medications:  Current Outpatient Medications   Medication Instructions    Adalimumab (Humira) 20 MG/0.4ML Prefilled Syringe Kit Every 14 Days    fluticasone (FLONASE) 50 MCG/ACT nasal spray 2 sprays, Daily    meloxicam (MOBIC) 15 mg, Daily    multivitamin with minerals tablet tablet 1 tablet, Daily    omeprazole (PRILOSEC) 20 mg, Daily    relugolix (ORGOVYX) 120 mg, Daily           Review of Systems   Per HPI.  Physical Examination     Vitals:    05/16/25 0910   BP: 135/87   Pulse: 83   SpO2: 99%   Weight: 82.1 kg (181 lb)   Height: 175.3 cm (69.02\")       Estimated body mass index is 26.72 kg/m² as calculated from the following:    Height as of this encounter: 175.3 cm (69.02\").    Weight as of this encounter: 82.1 kg (181 lb).  PREVIOUS WEIGHTS:   Wt Readings from Last 5 Encounters:   05/16/25 82.1 kg (181 lb)   04/22/25 81.6 kg (180 lb)   02/25/22 84.7 kg (186 lb 11.7 oz)   02/22/22 79.3 kg (174 lb 12.8 oz)       Physical Examination:  Gen: Disheveled, unkempt male, appears older than stated age, poor hygiene-awake, laying on exam bed  HEENT: NCAT, poor dentition, anicteric sclera  CV: RRR, normotensive  Resp: nonlabored on room air, sats appropriate  Abd: soft, nontender, nondistended; no visible scars, no palpable hernia  : Both testes present in scrotum, on the left, is exquisitely tender to palpation, no palpable hernia defect, and no palpable herniated contents; on the right, patient again exquisitely tender, swelling and fatty infiltration of the pubis is pronounced, but there is no palpable " hernia defect, no tussive impulse on either side  MSK: moves all four, no c/c/e  Neuro: no focal deficits, cranial nerves grossly intact  Psy: Has difficulty carrying on conversation: Significant flight of ideas      Data Review     Labs:  CBC  Lab Results   Component Value Date    WBC 5.41 02/25/2022    HGB 14.2 02/25/2022    HCT 42.5 02/25/2022     02/25/2022      Pathology:  Lab Results   Component Value Date    CASEREPORT  02/28/2022     Surgical Pathology Report                         Case: [id number]                                  Authorizing Provider:  Elia Blanco MD       Collected:           02/28/2022 07:14 AM          Ordering Location:     [facility] OR  Received:            02/28/2022 10:16 AM          Pathologist:           Cornelius Senior MD                                                        Specimens:   1) - Prostate, RIGHT MID LATERAL                                                                    2) - Prostate, LEFT APEX                                                                            3) - Prostate, LEFT LATERAL APEX                                                                    4) - Prostate, LEFT BASE                                                                            5) - Prostate, LEFT LATERAL BASE                                                                    6) - Prostate, LEFT MID                                                                             7) - Prostate, LEFT MID LATERAL                                                                     8) - Prostate, RIGHT APEX                                                                           9) - Prostate, RIGHT LATERAL APEX                                                                   10) - Prostate, RIGHT BASE                                                                          11) - Prostate, RIGHT LATERAL BASE                                                                   12) - Prostate, RIGHT MID                                                                  FINALDX  02/28/2022     1.  Prostate, right mid lateral, core biopsy:  Prostatic adenocarcinoma, acinar type, Jose Alejandro grade 3+3 = 6 (grade group 1) involving 75% of submitted tissue.    2.  Prostate, left apex, core biopsy:  Benign prostate glands and stroma.    3.  Prostate, left lateral apex, core biopsy:  Benign prostate glands and stroma.    4.  Prostate, left base, core biopsy:  Prostatic adenocarcinoma, acinar type, [place] grade 3+3 = 6 (grade Group 1) involving 5% of submitted tissue.    5.  Prostate, left lateral base, core biopsy:  Benign prostate glands and stroma.    6.  Prostate, left mid, core biopsy:  Benign prostate glands and stroma.    7.  Prostate, left mid lateral, core biopsy:  Benign prostate glands and stroma.    8.  Prostate, right apex, core biopsy:  Prostatic adenocarcinoma, acinar type, [place] grade 3+3 = 6 (grade Group 1) involving 80% of submitted tissue.    9.  Prostate, right lateral apex, core biopsy:  Prostatic adenocarcinoma, acinar type, [place] grade 3+4 = 7 (grade group 2) involving 80% of submitted tissue.    10.  Prostate, right base, core biopsy:  Prostatic adenocarcinoma, acinar type, [place] grade 3+3 = 6 (grade Group 1) involving 90% of submitted tissue.    11.  Prostate, right lateral base, core biopsy:  Prostatic adenocarcinoma, acinar type, Jose Alejandro grade 3+4 = 7 (grade group 2) involving 50% of submitted tissue.    12.  Prostate, right mid, core biopsies:  Prostatic adenocarcinoma, acinar type, Jose Alejandro grade 3+4 = 7 (grade group 2) involving 90% of submitted tissue.      GROSSDES  02/28/2022     Specimen 1A is received in formalin container labeled with the patient's name, date of birth and designated as right mid lateral.  Specimen consists of single tan core measuring 0.6 x 0.1 x 0.1 cm.  After tissue is inked with hematoxlin, the specimen is submitted in single  cassette.       Specimen 2A is received in formalin container labeled with the patient's name, date of birth and designated as left apex.  Specimen consists of single tan core measuring 1.2 x 0.1 x 0.1 cm.  After tissue is inked with hematoxlin, the specimen is submitted in single cassette.    Specimen 3A is received in formalin container labeled with the patient's name, date of birth and designated as left lateral apex.  Specimen consists of single tan core measuring 1.5 x 0.1 cm.  After tissue is inked with hematoxlin, the specimen is submitted in single cassette.       Specimen 4A is received in formalin container labeled with the patient's name, date of birth and designated as left base.  Specimen consists of single tan core measuring 1.5 x 0.1 x 0.1.  After tissue is inked with hematoxlin, the specimen is submitted in single cassette.       Specimen 5A is received in formalin container labeled with the patient's name, date of birth and designated as left lateral base.  Specimen consists of single tan core measuring one-point.  After tissue is inked with hematoxlin, the specimen is submitted in single cassette.       Specimen 6A is received in formalin container labeled with the patient's name, date of birth and designated as left mid.  Specimen consists of 2 tan cores measuring 0.2 x 0.1 x 0.1 and 1.5 x 0.1 x 0.1 cm.  After tissue is inked with hematoxlin, the specimen is submitted in single cassette.       Specimen 7A is received in formalin container labeled with the patient's name, date of birth and designated as left mid lateral.  Specimen consists of 2 tan cores measuring 1.0 x 0.1 x 0.1 0.2 x 0.1 x 0.1cm.  After tissue is inked with hematoxlin, the specimen is submitted in single cassette.       Specimen 8A is received in formalin container labeled with the patient's name, date of birth and designated as right apex.  Specimen consists of single tan core measuring 1.3 x 0.1 x 0.1 cm.  After tissue is inked  with hematoxlin, the specimen is submitted in single cassette.       Specimen 9A is received in formalin container labeled with the patient's name, date of birth and designated as right lateral apex.  Specimen consists of single tan piece of tissue measuring 1.3 x 0.1 x 0.1 cm.  After tissue is inked with hematoxlin, the specimen is submitted in single cassette.       Specimen 10A is received in formalin container labeled with the patient's name, date of birth and designated as right base.  Specimen consists of single tan core measuring 1.5 x 0.1 x 01 cm.  After tissue is inked with hematoxlin, the specimen is submitted in single cassette.       Specimen 11A is received in formalin container labeled with the patient's name, date of birth and designated as right lateral base.  Specimen consists of single tan core measuring 1.0 x 0.1 x 0.1 cm.  After tissue is inked with hematoxlin, the specimen is submitted in single cassette.       Specimen 12A is received in formalin container labeled with the patient's name, date of birth and designated as right mid.  Specimen consists of 2 tan cores measuring 1.0 x 0.1 x 0.1 and 1.5 x 0.1 x 0.1 cm.  After tissue is inked with hematoxlin, the specimen is submitted in single cassette.         MICRO  02/28/2022     Performed.         Problem List     Patient Active Problem List   Diagnosis    Elevated PSA    Malignant neoplasm of prostate    Current every day smoker    Inguinal hernia without obstruction or gangrene            Assessment/Plan     Assessment & Plan  1. Hernia.  Upon examination, there is no palpable evidence of a true hernia. Significant inflammation and a substantial fat pad in the pubic region are present, which may obscure the detection of a hernia. No discernible opening or herniated contents were identified. If the imaging is not sufficient, additional imaging may be required. If the hernia worsens significantly, the possibility of adjusting cancer treatment to  address the hernia will be considered.  Additionally, I am not at all convinced that surgical repair of his hernia, while undergoing chemo/rads treatments, meant to mention remaining active smoker, is all a good idea especially with his history of poor compliance and failure to follow-up.  Once imaging is reviewed, we will plan to review with patient, to see if can develop a plan for treatment, observation, or referral.    .  2. Prostate cancer.  Currently undergoing treatment with Orgovyx and radiation therapy at [facility]. The importance of completing cancer treatment before considering hernia repair was discussed due to the potential impact of radiation on healing and surgical outcomes. Continued monitoring of the cancer treatment progress is necessary.           Smoking cessation: Active smoker-patient not interested in cessation or counseling regarding same  BMI is >= 25 and <30. (Overweight) The following options were offered after discussion;: exercise counseling/recommendations and nutrition counseling/recommendations  Med rec complete: yes  VTE: VTE PPX is not indicated.    Time Spent: I spent 60 minutes caring for Mio Askew on this date of service. This time includes time, independent of any procedures, spent by me in the following activities: preparing for the clinic visit, reviewing tests, obtaining and/or reviewing a separately obtained history, performing a medically appropriate examination and/or evaluation, counseling and educating the patient/family/caregiver, ordering medications, tests, or procedures, and documenting information in the medical record.     Rubens Cosby MD  5/16/2025   17:36 CDT    Patient or patient representative verbalized consent for the use of Ambient Listening during the visit with  Rubens Cosby MD for chart documentation. 5/16/2025  22:19 CDT

## 2025-06-04 ENCOUNTER — TELEPHONE (OUTPATIENT)
Dept: UROLOGY | Facility: CLINIC | Age: 60
End: 2025-06-04
Payer: OTHER GOVERNMENT

## 2025-06-04 NOTE — TELEPHONE ENCOUNTER
I tried calling patient a couple of times, going straight to a recording stating he's unavailable and I cannot leave a voicemail. Calling to let pt know that Dr. Rivera office would be calling to get him set up with a different physicain for the SpaceOAR placement.

## 2025-06-06 NOTE — TELEPHONE ENCOUNTER
Pt called back today stating that he got a new phone number which is now [phone]. I let pt know that Dr. Mcfadden's office will be calling him to discuss him being referred to another office for his SpaceOAR.     Pt verbalized understanding.

## 2025-06-09 DIAGNOSIS — C61 MALIGNANT NEOPLASM OF PROSTATE: Primary | ICD-10-CM

## 2025-06-16 ENCOUNTER — TELEPHONE (OUTPATIENT)
Age: 60
End: 2025-06-16
Payer: OTHER GOVERNMENT

## 2025-06-16 NOTE — TELEPHONE ENCOUNTER
I called patient to let him know we are waiting on [facility] auth to get that referral approved for urology at [place]. I had to leave him a message on his voicemail.

## 2025-06-16 NOTE — TELEPHONE ENCOUNTER
Called [facility] to check on auth for [facility]. I spoke with Paulina and they are running behind and will get to it as soon as they can.

## 2025-06-17 ENCOUNTER — TELEPHONE (OUTPATIENT)
Age: 60
End: 2025-06-17
Payer: OTHER GOVERNMENT

## 2025-06-17 NOTE — TELEPHONE ENCOUNTER
I spoke with Nona at [facility]. They did get [facility] auth and will try to reach out to patient to schedule. They will call me back also.

## 2025-06-18 ENCOUNTER — TELEPHONE (OUTPATIENT)
Age: 60
End: 2025-06-18
Payer: OTHER GOVERNMENT

## 2025-06-18 NOTE — TELEPHONE ENCOUNTER
Sindy with [facility] called and she said [facility] sent patient a message on my chart to call and schedule. I let them know they he has to be called only. She said she tried to call him but she did not use the updated phone number that I had give out. So she will try to call him again.

## 2025-06-23 ENCOUNTER — TELEPHONE (OUTPATIENT)
Age: 60
End: 2025-06-23
Payer: OTHER GOVERNMENT

## 2025-06-23 NOTE — TELEPHONE ENCOUNTER
I spoke with Irlanda at [facility], [phone] and patient has been scheduled for at 1:45pm on July 16th 2025.

## (undated) DEVICE — GOWN,NON-REINFORCED,SIRUS,SET IN SLV,XXL: Brand: MEDLINE

## (undated) DEVICE — PK TURNOVER CYSTO RM

## (undated) DEVICE — MAX-CORE® DISPOSABLE CORE BIOPSY INSTRUMENT, 18G X 20CM: Brand: MAX-CORE

## (undated) DEVICE — SYR LUERLOK 20CC BX/50

## (undated) DEVICE — GLV SURG BIOGEL M LTX PF 8

## (undated) DEVICE — SPNG GZ WOVN 4X4IN 12PLY 10/BX STRL

## (undated) DEVICE — GUIDE NDL BIOP BIPLANE 17G STRL 1P/U

## (undated) DEVICE — TOWEL,OR,DSP,ST,BLUE,STD,4/PK,20PK/CS: Brand: MEDLINE

## (undated) DEVICE — NEEDLE, QUINCKE 22GX7": Brand: MEDLINE